# Patient Record
Sex: FEMALE | Race: WHITE | Employment: STUDENT | URBAN - METROPOLITAN AREA
[De-identification: names, ages, dates, MRNs, and addresses within clinical notes are randomized per-mention and may not be internally consistent; named-entity substitution may affect disease eponyms.]

---

## 2020-08-10 ENCOUNTER — HOSPITAL ENCOUNTER (EMERGENCY)
Facility: CLINIC | Age: 20
Discharge: HOME | End: 2020-08-10
Attending: EMERGENCY MEDICINE | Admitting: EMERGENCY MEDICINE
Payer: COMMERCIAL

## 2020-08-10 ENCOUNTER — APPOINTMENT (OUTPATIENT)
Dept: GENERAL RADIOLOGY | Facility: CLINIC | Age: 20
End: 2020-08-10
Attending: EMERGENCY MEDICINE
Payer: COMMERCIAL

## 2020-08-10 VITALS
HEART RATE: 100 BPM | HEIGHT: 69 IN | DIASTOLIC BLOOD PRESSURE: 81 MMHG | SYSTOLIC BLOOD PRESSURE: 126 MMHG | WEIGHT: 117 LBS | BODY MASS INDEX: 17.33 KG/M2 | TEMPERATURE: 98.9 F | RESPIRATION RATE: 16 BRPM | OXYGEN SATURATION: 99 %

## 2020-08-10 DIAGNOSIS — M70.841: ICD-10-CM

## 2020-08-10 PROCEDURE — 99283 EMERGENCY DEPT VISIT LOW MDM: CPT

## 2020-08-10 PROCEDURE — 99283 EMERGENCY DEPT VISIT LOW MDM: CPT | Mod: Z6 | Performed by: EMERGENCY MEDICINE

## 2020-08-10 PROCEDURE — 73130 X-RAY EXAM OF HAND: CPT | Mod: RT

## 2020-08-10 ASSESSMENT — MIFFLIN-ST. JEOR: SCORE: 1365.09

## 2020-08-10 NOTE — ED AVS SNAPSHOT
[facility], [place], Emergency Department  [address] [place] [id number]  Phone:  [phone]                                    Rachana Brambila   MRN: [id number]    Department:  [facility], Emergency Department   Date of Visit:  8/10/2020           After Visit Summary Signature Page    I have received my discharge instructions, and my questions have been answered. I have discussed any challenges I see with this plan with the nurse or doctor.    ..........................................................................................................................................  Patient/Patient Representative Signature      ..........................................................................................................................................  Patient Representative Print Name and Relationship to Patient    ..................................................               ................................................  Date                                   Time    ..........................................................................................................................................  Reviewed by Signature/Title    ...................................................              ..............................................  Date                                               Time          22EPIC Rev 08/18

## 2020-08-11 NOTE — ED PROVIDER NOTES
"    [place] EMERGENCY DEPARTMENT ([facility])  August 10, 2020  History     Chief Complaint   Patient presents with     Hand Pain     HPI  Rachana Brambila is a 19 year old female who presents to the Emergency Department with right hand pain.  Patient states that she was typing today when she family had an unusual feeling and her hands.  She described as pain and and joints of her hands on the right hand.  She states that she has a significant amount of discomfort.  She felt like she was unable to move her hand after.  She felt like maybe the symptoms were coming from her elbow and coming forward.  She denies any other trauma and has not had any symptoms like this in the past.      PAST MEDICAL HISTORY: History reviewed. No pertinent past medical history.    PAST SURGICAL HISTORY: History reviewed. No pertinent surgical history.    Past medical history, past surgical history, medications, and allergies were reviewed with the patient. Additional pertinent items: None    FAMILY HISTORY: History reviewed. No pertinent family history.    SOCIAL HISTORY:   Social History     Tobacco Use     Smoking status: Never Smoker     Smokeless tobacco: Never Used   Substance Use Topics     Alcohol use: Not Currently     Social history was reviewed with the patient. Additional pertinent items: None      There are no discharge medications for this patient.       No Known Allergies     Review of Systems  A complete review of systems was performed with pertinent positives and negatives noted in the HPI, and all other systems negative.    Physical Exam   BP: 126/81  Pulse: 100  Temp: 98.9  F (37.2  C)  Resp: 16  Height: 175.3 cm (5' 9\")  Weight: 53.1 kg (117 lb)  SpO2: 99 %      Physical Exam  Vitals signs and nursing note reviewed.   Constitutional:       General: She is not in acute distress.     Appearance: She is well-developed. She is not diaphoretic.      Comments: Comfortably resting, lying in bed, NAD, nondiaphoretic, " lucid, fully conversant, no  respiratory distress, alert and oriented.     HENT:      Head: Normocephalic and atraumatic.   Eyes:      General: No scleral icterus.  Neck:      Musculoskeletal: Normal range of motion and neck supple.   Musculoskeletal:      Right hand: Normal. She exhibits normal range of motion, no tenderness, no bony tenderness, normal two-point discrimination, normal capillary refill, no deformity, no laceration and no swelling. Normal sensation noted. Normal strength noted.   Skin:     General: Skin is warm and dry.      Coloration: Skin is not pale.      Findings: No erythema or rash.   Neurological:      Mental Status: She is alert and oriented to person, place, and time.         ED Course        Procedures                   Results for orders placed or performed during the hospital encounter of 08/10/20   XR Hand Right G/E 3 Views     Status: None    Narrative    EXAM: XR HAND RT G/E 3 VW  LOCATION: [facility]  DATE/TIME: 8/10/2020 7:22 PM    INDICATION: Hand pain  COMPARISON: None.      Impression    IMPRESSION: Normal joint spaces and alignment. No fracture.               No results found for this or any previous visit (from the past 24 hour(s)).  Medications - No data to display          Assessments & Plan (with Medical Decision Making)   This is a 19-year-old female patient coming into the emergency room with complaints of discomfort in her right hand while she was typing today.  She was complaining of discomfort in the joints of her fingers.  On physical exam she has no deformity, swelling, changes in sensation and otherwise has good range of motion of her fingers and hands as well as her wrist.  She has good pulses throughout.  Is unclear as to the actual issue at this time but it may be associated with overuse from typing.  X-rays were performed by the triage nurse which are otherwise unremarkable.  My physical exam was otherwise benign and there is no signs of carpal  "tunnel.  Believe she can be safely discharged with instructions to follow-up with orthopedic service or sports medicine.    Pt was discharged home/self-care.  PT was provided written discharge instructions. Additionally verbal instructions were given and discussed with patient.  PT was asked to return to the ED immediately for any new or concerning symptoms.  Pt was in agreement, endorsed understanding, and questions were answered.       I have reviewed the nursing notes.    I have reviewed the findings, diagnosis, plan and need for follow up with the patient.    There are no discharge medications for this patient.      Final diagnoses:   Other soft tissue disorders related to use, overuse and pressure, right hand     \"This dictation was performed with the assistance of voice recognition software and may contain inadvertant transcription  errors,  omissions and/or  inadvertent word substitution.\" --Akash King MD     8/10/2020   [facility], EMERGENCY DEPARTMENT     Akash King MD  08/11/20 2528    " Bladder non-tender and non-distended. Urine clear yellow.

## 2020-08-11 NOTE — ED TRIAGE NOTES
Pt arrives to triage with complaints of R finger/hand pain. Pt reports she hit her elbow on the wall earlier today. Pt unable to extend fingers on R hand. Pt reports this has happened in the past. A&O VSS

## 2020-08-11 NOTE — DISCHARGE INSTRUCTIONS
Please make an appointment to follow up with Orthopedics [facility] (phone: [phone]) as soon as possible if you have any concerns.  Results for orders placed or performed during the hospital encounter of 08/10/20   XR Hand Right G/E 3 Views     Status: None    Narrative    EXAM: XR HAND RT G/E 3 VW  LOCATION: [facility]  DATE/TIME: 8/10/2020 7:22 PM    INDICATION: Hand pain  COMPARISON: None.      Impression    IMPRESSION: Normal joint spaces and alignment. No fracture.

## 2021-05-30 ENCOUNTER — RECORDS - HEALTHEAST (OUTPATIENT)
Dept: ADMINISTRATIVE | Facility: CLINIC | Age: 21
End: 2021-05-30